# Patient Record
Sex: FEMALE
[De-identification: names, ages, dates, MRNs, and addresses within clinical notes are randomized per-mention and may not be internally consistent; named-entity substitution may affect disease eponyms.]

---

## 2024-08-29 ENCOUNTER — APPOINTMENT (OUTPATIENT)
Dept: CARE COORDINATION | Facility: HOME HEALTH | Age: 64
End: 2024-08-29
Payer: MEDICARE

## 2024-08-29 DIAGNOSIS — I10 ESSENTIAL (PRIMARY) HYPERTENSION: ICD-10-CM

## 2024-08-29 DIAGNOSIS — Z74.1 NEED FOR ASSISTANCE WITH PERSONAL CARE: ICD-10-CM

## 2024-08-29 DIAGNOSIS — E11.43 TYPE 2 DIABETES MELLITUS WITH DIABETIC AUTONOMIC (POLY)NEUROPATHY: ICD-10-CM

## 2024-08-29 DIAGNOSIS — I50.9 HEART FAILURE, UNSPECIFIED: ICD-10-CM

## 2024-08-29 DIAGNOSIS — E11.9 TYPE 2 DIABETES MELLITUS W/OUT COMPLICATIONS: ICD-10-CM

## 2024-08-29 PROBLEM — Z00.00 ENCOUNTER FOR PREVENTIVE HEALTH EXAMINATION: Status: ACTIVE | Noted: 2024-08-29

## 2024-08-29 PROCEDURE — 99443: CPT | Mod: 93

## 2024-08-29 RX ORDER — METFORMIN HYDROCHLORIDE 1000 MG/1
1000 TABLET, COATED ORAL
Refills: 0 | Status: ACTIVE | COMMUNITY
Start: 2024-08-29

## 2024-08-29 RX ORDER — AMLODIPINE BESYLATE 5 MG/1
5 TABLET ORAL
Qty: 30 | Refills: 0 | Status: ACTIVE | COMMUNITY
Start: 2024-08-29

## 2024-08-29 RX ORDER — INSULIN GLARGINE 100 [IU]/ML
100 INJECTION, SOLUTION SUBCUTANEOUS
Refills: 0 | Status: ACTIVE | COMMUNITY
Start: 2024-08-29

## 2024-08-29 RX ORDER — CARVEDILOL 25 MG/1
25 TABLET, FILM COATED ORAL TWICE DAILY
Refills: 0 | Status: ACTIVE | COMMUNITY
Start: 2024-08-29

## 2024-08-29 RX ORDER — TIRZEPATIDE 5 MG/.5ML
5 INJECTION, SOLUTION SUBCUTANEOUS
Refills: 0 | Status: ACTIVE | COMMUNITY
Start: 2024-08-29

## 2024-08-29 RX ORDER — INSULIN ASPART 100 [IU]/ML
100 INJECTION, SOLUTION INTRAVENOUS; SUBCUTANEOUS 3 TIMES DAILY
Refills: 0 | Status: ACTIVE | COMMUNITY
Start: 2024-08-29

## 2024-08-30 PROBLEM — I50.9 CHF (CONGESTIVE HEART FAILURE): Status: ACTIVE | Noted: 2024-08-29

## 2024-08-30 PROBLEM — E11.43 DIABETIC AUTONOMIC NEUROPATHY ASSOCIATED WITH TYPE 2 DIABETES MELLITUS: Status: ACTIVE | Noted: 2024-08-29

## 2024-08-30 PROBLEM — E11.9 T2DM (TYPE 2 DIABETES MELLITUS): Status: ACTIVE | Noted: 2024-08-29

## 2024-08-30 PROBLEM — I10 HTN (HYPERTENSION): Status: ACTIVE | Noted: 2024-08-29

## 2024-08-30 NOTE — PLAN
[FreeTextEntry1] : 1.cont all medications as prescribed 2. maintain diabetic diet 3. maintain fall precautions 4. cont blood glucose monitoring 5. f/u with PCP for PT referral

## 2024-08-30 NOTE — HISTORY OF PRESENT ILLNESS
[Home] : at home, [unfilled] , at the time of the visit. [Other Location: e.g. Home (Enter Location, City,State)___] : at [unfilled] [Verbal consent obtained from patient] : the patient, [unfilled] [FreeTextEntry1] : F/U for [facility] Quality Dx Review [de-identified] : Pt is a 62 yo female with PMH of HTN, HLD, DMT2, Dry eyes, Diabetic Neuropathy, Kidney stone, falls. Pt is alert and oriented x3, lives with family. C/o pain to her thighs, lower back and neuropathic pain to lower extremities. Pt states she has difficulty standing for long periods of time, she uses a cane when ambulating. She takes Gabapentin. Pt reports she has a HHA for 5hrs and her PCP has submitted documentation to [facility] for her to get an increased in aide hrs.

## 2024-08-30 NOTE — COUNSELING
[Fall prevention counseling provided] : Fall prevention counseling provided [No throw rugs] : No throw rugs [Use recommended devices] : Use recommended devices [None] : None [Good understanding] : Patient has a good understanding of lifestyle changes and steps needed to achieve self management goal [FreeTextEntry1] : walker and cane

## 2024-08-30 NOTE — INTERPRETER SERVICES
[Other: ______] : provided by KEAGAN [Time Spent: ____ minutes] : Total time spent using  services: [unfilled] minutes. The patient's primary language is not English thus required  services. [Interpreters_IDNumber] : [id number] [Interpreters_FullName] : Gris [TWNoteComboBox1] : Qatari

## 2024-08-30 NOTE — INTERPRETER SERVICES
[Other: ______] : provided by KEAGAN [Time Spent: ____ minutes] : Total time spent using  services: [unfilled] minutes. The patient's primary language is not English thus required  services. [Interpreters_IDNumber] : [id number] [Interpreters_FullName] : Gris [TWNoteComboBox1] : Peruvian

## 2024-08-30 NOTE — ASSESSMENT
[FreeTextEntry1] : 62 yo female with PMH of HTN, HLD, DMT2, Dry eyes, Diabetic Neuropathy, Kidney stone, falls. Pt is alert and oriented x3, lives with family. C/o pain to her thighs, lower back and neuropathic pain to lower extremities. Pt states she has difficulty standing for long periods of time, she uses a cane when ambulating. She takes Gabapentin.

## 2024-08-30 NOTE — HISTORY OF PRESENT ILLNESS
[Home] : at home, [unfilled] , at the time of the visit. [Other Location: e.g. Home (Enter Location, City,State)___] : at [unfilled] [Verbal consent obtained from patient] : the patient, [unfilled] [FreeTextEntry1] : F/U for [facility] Quality Dx Review [de-identified] : Pt is a 64 yo female with PMH of HTN, HLD, DMT2, Dry eyes, Diabetic Neuropathy, Kidney stone, falls. Pt is alert and oriented x3, lives with family. C/o pain to her thighs, lower back and neuropathic pain to lower extremities. Pt states she has difficulty standing for long periods of time, she uses a cane when ambulating. She takes Gabapentin. Pt reports she has a HHA for 5hrs and her PCP has submitted documentation to [facility] for her to get an increased in aide hrs.

## 2024-08-30 NOTE — REVIEW OF SYSTEMS
[Fatigue] : fatigue [Vision Problems] : vision problems [Constipation] : constipation [Incontinence] : incontinence [Nocturia] : nocturia [Joint Pain] : joint pain [Joint Stiffness] : joint stiffness [Muscle Weakness] : muscle weakness [Muscle Pain] : muscle pain [Itching] : Itching [Unsteady Walking] : ataxia [Insomnia] : insomnia [Depression] : depression [Negative] : Heme/Lymph [Fever] : no fever [Chills] : no chills [Hearing Loss] : no hearing loss [Nosebleed] : no nosebleeds [Nasal Discharge] : no nasal discharge [Chest Pain] : no chest pain [Palpitations] : no palpitations [Lower Ext Edema] : no lower extremity edema [Shortness Of Breath] : no shortness of breath [Cough] : no cough [Abdominal Pain] : no abdominal pain [Nausea] : no nausea [Diarrhea] : diarrhea [Vomiting] : no vomiting [Dysuria] : no dysuria [Poor Libido] : libido not poor [Hematuria] : no hematuria [Joint Swelling] : no joint swelling [Skin Rash] : no skin rash [Memory Loss] : no memory loss [Suicidal] : not suicidal [Anxiety] : no anxiety [FreeTextEntry3] : cataract- OPH saw yesterday wears glasses [FreeTextEntry4] : ear plugs ENT - needs ear plugs [FreeTextEntry7] : takes raisins which helps [de-identified] : itching  [de-identified] : fell 2-3 times [de-identified] : wakes up at nights - makes it up in the morning

## 2024-08-30 NOTE — REVIEW OF SYSTEMS
[Fatigue] : fatigue [Vision Problems] : vision problems [Constipation] : constipation [Incontinence] : incontinence [Nocturia] : nocturia [Joint Pain] : joint pain [Joint Stiffness] : joint stiffness [Muscle Weakness] : muscle weakness [Muscle Pain] : muscle pain [Itching] : Itching [Unsteady Walking] : ataxia [Insomnia] : insomnia [Depression] : depression [Negative] : Heme/Lymph [Fever] : no fever [Chills] : no chills [Hearing Loss] : no hearing loss [Nosebleed] : no nosebleeds [Nasal Discharge] : no nasal discharge [Chest Pain] : no chest pain [Palpitations] : no palpitations [Lower Ext Edema] : no lower extremity edema [Shortness Of Breath] : no shortness of breath [Cough] : no cough [Abdominal Pain] : no abdominal pain [Nausea] : no nausea [Diarrhea] : diarrhea [Vomiting] : no vomiting [Dysuria] : no dysuria [Poor Libido] : libido not poor [Hematuria] : no hematuria [Joint Swelling] : no joint swelling [Skin Rash] : no skin rash [Memory Loss] : no memory loss [Suicidal] : not suicidal [Anxiety] : no anxiety [FreeTextEntry3] : cataract- OPH saw yesterday wears glasses [FreeTextEntry4] : ear plugs ENT - needs ear plugs [FreeTextEntry7] : takes raisins which helps [de-identified] : itching  [de-identified] : fell 2-3 times [de-identified] : wakes up at nights - makes it up in the morning

## 2024-08-30 NOTE — REVIEW OF SYSTEMS
[Fatigue] : fatigue [Vision Problems] : vision problems [Constipation] : constipation [Incontinence] : incontinence [Nocturia] : nocturia [Joint Pain] : joint pain [Joint Stiffness] : joint stiffness [Muscle Weakness] : muscle weakness [Muscle Pain] : muscle pain [Itching] : Itching [Unsteady Walking] : ataxia [Insomnia] : insomnia [Depression] : depression [Negative] : Heme/Lymph [Fever] : no fever [Chills] : no chills [Hearing Loss] : no hearing loss [Nosebleed] : no nosebleeds [Nasal Discharge] : no nasal discharge [Chest Pain] : no chest pain [Palpitations] : no palpitations [Lower Ext Edema] : no lower extremity edema [Shortness Of Breath] : no shortness of breath [Cough] : no cough [Abdominal Pain] : no abdominal pain [Nausea] : no nausea [Diarrhea] : diarrhea [Vomiting] : no vomiting [Dysuria] : no dysuria [Poor Libido] : libido not poor [Hematuria] : no hematuria [Joint Swelling] : no joint swelling [Skin Rash] : no skin rash [Memory Loss] : no memory loss [Suicidal] : not suicidal [Anxiety] : no anxiety [FreeTextEntry3] : cataract- OPH saw yesterday wears glasses [FreeTextEntry4] : ear plugs ENT - needs ear plugs [FreeTextEntry7] : takes raisins which helps [de-identified] : itching  [de-identified] : fell 2-3 times [de-identified] : wakes up at nights - makes it up in the morning

## 2024-08-30 NOTE — HISTORY OF PRESENT ILLNESS
[Home] : at home, [unfilled] , at the time of the visit. [Other Location: e.g. Home (Enter Location, City,State)___] : at [unfilled] [Verbal consent obtained from patient] : the patient, [unfilled] [FreeTextEntry1] : F/U for [facility] Quality Dx Review [de-identified] : Pt is a 62 yo female with PMH of HTN, HLD, DMT2, Dry eyes, Diabetic Neuropathy, Kidney stone, falls. Pt is alert and oriented x3, lives with family. C/o pain to her thighs, lower back and neuropathic pain to lower extremities. Pt states she has difficulty standing for long periods of time, she uses a cane when ambulating. She takes Gabapentin. Pt reports she has a HHA for 5hrs and her PCP has submitted documentation to [facility] for her to get an increased in aide hrs.

## 2024-08-30 NOTE — INTERPRETER SERVICES
[Other: ______] : provided by KEAGAN [Time Spent: ____ minutes] : Total time spent using  services: [unfilled] minutes. The patient's primary language is not English thus required  services. [Interpreters_IDNumber] : [id number] [Interpreters_FullName] : Gris [TWNoteComboBox1] : Monegasque